# Patient Record
Sex: MALE | Race: BLACK OR AFRICAN AMERICAN
[De-identification: names, ages, dates, MRNs, and addresses within clinical notes are randomized per-mention and may not be internally consistent; named-entity substitution may affect disease eponyms.]

---

## 2020-01-05 ENCOUNTER — HOSPITAL ENCOUNTER (INPATIENT)
Dept: HOSPITAL 92 - ERS | Age: 45
LOS: 4 days | Discharge: ANOTHER HOSPITAL | DRG: 445 | End: 2020-01-09
Attending: INTERNAL MEDICINE | Admitting: INTERNAL MEDICINE
Payer: COMMERCIAL

## 2020-01-05 VITALS — BODY MASS INDEX: 21.7 KG/M2

## 2020-01-05 DIAGNOSIS — D63.8: ICD-10-CM

## 2020-01-05 DIAGNOSIS — D68.4: ICD-10-CM

## 2020-01-05 DIAGNOSIS — K83.1: Primary | ICD-10-CM

## 2020-01-05 DIAGNOSIS — Z79.899: ICD-10-CM

## 2020-01-05 DIAGNOSIS — D64.9: ICD-10-CM

## 2020-01-05 DIAGNOSIS — C78.7: ICD-10-CM

## 2020-01-05 DIAGNOSIS — E87.1: ICD-10-CM

## 2020-01-05 DIAGNOSIS — K22.8: ICD-10-CM

## 2020-01-05 DIAGNOSIS — C25.9: ICD-10-CM

## 2020-01-05 DIAGNOSIS — Z90.49: ICD-10-CM

## 2020-01-05 DIAGNOSIS — Z88.0: ICD-10-CM

## 2020-01-05 LAB
ALBUMIN SERPL BCG-MCNC: 3.2 G/DL (ref 3.5–5)
ALP SERPL-CCNC: 1737 U/L (ref 40–110)
ALT SERPL W P-5'-P-CCNC: 81 U/L (ref 8–55)
ANION GAP SERPL CALC-SCNC: 16 MMOL/L (ref 10–20)
APTT PPP: 47.4 SEC (ref 22.9–36.1)
AST SERPL-CCNC: 75 U/L (ref 5–34)
BASOPHILS # BLD AUTO: 0.1 THOU/UL (ref 0–0.2)
BASOPHILS NFR BLD AUTO: 0.8 % (ref 0–1)
BILIRUB SERPL-MCNC: 4.6 MG/DL (ref 0.2–1.2)
BUN SERPL-MCNC: 9 MG/DL (ref 8.9–20.6)
CALCIUM SERPL-MCNC: 9.6 MG/DL (ref 7.8–10.44)
CHLORIDE SERPL-SCNC: 101 MMOL/L (ref 98–107)
CO2 SERPL-SCNC: 23 MMOL/L (ref 22–29)
CREAT CL PREDICTED SERPL C-G-VRATE: 0 ML/MIN (ref 70–130)
EOSINOPHIL # BLD AUTO: 0.1 THOU/UL (ref 0–0.7)
EOSINOPHIL NFR BLD AUTO: 1.6 % (ref 0–10)
GLOBULIN SER CALC-MCNC: 4.6 G/DL (ref 2.4–3.5)
GLUCOSE SERPL-MCNC: 206 MG/DL (ref 70–105)
HGB BLD-MCNC: 11.9 G/DL (ref 14–18)
INR PPP: 1.7
LYMPHOCYTES # BLD: 2.4 THOU/UL (ref 1.2–3.4)
LYMPHOCYTES NFR BLD AUTO: 27.6 % (ref 21–51)
MCH RBC QN AUTO: 28.9 PG (ref 27–31)
MCV RBC AUTO: 89.5 FL (ref 78–98)
MONOCYTES # BLD AUTO: 0.6 THOU/UL (ref 0.11–0.59)
MONOCYTES NFR BLD AUTO: 6.5 % (ref 0–10)
NEUTROPHILS # BLD AUTO: 5.5 THOU/UL (ref 1.4–6.5)
NEUTROPHILS NFR BLD AUTO: 63.4 % (ref 42–75)
PLATELET # BLD AUTO: 300 THOU/UL (ref 130–400)
POTASSIUM SERPL-SCNC: 4.7 MMOL/L (ref 3.5–5.1)
PROTHROMBIN TIME: 19.9 SEC (ref 12–14.7)
RBC # BLD AUTO: 4.11 MILL/UL (ref 4.7–6.1)
SODIUM SERPL-SCNC: 135 MMOL/L (ref 136–145)
WBC # BLD AUTO: 8.7 THOU/UL (ref 4.8–10.8)

## 2020-01-05 PROCEDURE — 80053 COMPREHEN METABOLIC PANEL: CPT

## 2020-01-05 PROCEDURE — 36415 COLL VENOUS BLD VENIPUNCTURE: CPT

## 2020-01-05 PROCEDURE — 85730 THROMBOPLASTIN TIME PARTIAL: CPT

## 2020-01-05 PROCEDURE — 74330 X-RAY BILE/PANC ENDOSCOPY: CPT

## 2020-01-05 PROCEDURE — 99285 EMERGENCY DEPT VISIT HI MDM: CPT

## 2020-01-05 PROCEDURE — 85025 COMPLETE CBC W/AUTO DIFF WBC: CPT

## 2020-01-05 PROCEDURE — 85610 PROTHROMBIN TIME: CPT

## 2020-01-05 PROCEDURE — 36416 COLLJ CAPILLARY BLOOD SPEC: CPT

## 2020-01-05 RX ADMIN — METRONIDAZOLE SCH MLS: 500 INJECTION, SOLUTION INTRAVENOUS at 22:02

## 2020-01-05 NOTE — PDOC.EVN
Event Note





- Event Note


Event Note: 





Called by RN for blood sugar over 300.  Pt has NPH ordered for tonight.  I 

added humalog bedtime sliding scale. no further needs.

## 2020-01-05 NOTE — HP
HISTORY OF PRESENT ILLNESS:  Mr. Alfredo is a 44-year-old black man.  He was brought

to the ER earlier today under police custody with complaint of abdominal pain.  He

was evaluated and was felt to have a pancreatic duct obstruction.  GI was consulted. 



The patient was diagnosed with pancreatic cancer about 5 months ago and since then

he has been on insulin for diabetes.  No other significant past medical history. 



PAST SURGICAL HISTORY:  Remarkable for appendectomy and also he had some surgery

involving his left hand. 



ALLERGIES:  HE CLAIMS TO HAVE ALLERGIC TO PENICILLIN.



SOCIAL HISTORY:  He denies any history of cigarette smoking.  Denies EtOH abuse.

Denies substance abuse. 



FAMILY HISTORY:  His family history was reviewed and is not contributory.



MEDICATIONS:  Prior to admission, he was on; 

1. Methocarbamol.

2. NPH insulin 17 units in the morning and 13 units in the evening.

3. Regular insulin 15 units b.i.d.

4. Gabapentin.

5. Morphine.



REVIEW OF SYSTEMS:  CONSTITUTIONAL:  He claims that he has some fever yesterday.  He

denies any weakness. 

HEENT:  No headache.  No ocular pain.  No sore throat.  No rhinorrhea.  No earache.

No epistaxis. 

NECK:  No neck pain.  No neck stiffness. 

CARDIOVASCULAR:  No shortness of breath.  No chest pain. 

PULMONARY:  No coughing. 

GASTROINTESTINAL:  Admits to abdominal pain.  There is no diarrhea.  No vomiting. 

GENITOURINARY:  No dysuria.  No hematuria. 

ENDOCRINOLOGY:  No heat or cold intolerance.  No polyuria, polydipsia, or

polyphagia. 

HEMATOLOGY:  No abnormal bleeding.  No ecchymosis. 

LYMPHATIC:  No palpable lymphadenopathy.  No painful lymphadenopathy. 

SKIN:  No rash.  No itching. 

ALLERGY:  No hay fever. 

MUSCULOSKELETAL:  No arthritis. 

NEUROLOGICAL:  No seizure. 

PSYCHIATRIC:  No anxiety.  No depression.



PHYSICAL EXAMINATION:

GENERAL:  At the current time, he is alert, oriented, in no distress. 

VITAL SIGNS:  His latest vital signs show a temperature of 99.1, pulse rate 87,

respiratory rate 16, and blood pressure 126/93. 

HEENT:  His head is normocephalic and atraumatic.  Both his pupils are equal and

reactive.  Ears and nose are normal.  Oral mucosa is moist.  Pharyngeal area is

clear. 

NECK:  Supple.  There is no distention of the jugular vein.  No lymphadenopathy

felt.  Thyroid gland not palpable.  There is no carotid bruit. 

CHEST:  Symmetrical with regular S1 and S2. 

LUNGS:  Clear. 

ABDOMEN:  Soft.  Bowel sounds heard.  We could not appreciate any organomegaly. 

EXTREMITIES:  Limbs show no edema. 

NEUROLOGICAL:  He moves all extremities.



LABORATORY DATA:  PT was noticed to be 19.9, PTT 47.4.  Glucose at that same time

was noticed to be 249. 



ASSESSMENT AND PLAN:  This is a 44-year-old black man with pancreatic cancer,

diabetes mellitus, who was admitted with abdominal pain, possible pancreatic duct

obstruction.  GI was consulted.  The patient will be n.p.o. for now.  He will be

started on normal saline.  He will also be on insulin via sliding scale.  He will be

admitted to the medical floor.  Further evaluation and management will depend on the

course of his hospitalization and the results will depend on the course of his

hospitalization.  His PT and PTT were noticed to be prolonged.  We will hold any

Lovenox for deep venous thrombosis prophylaxis at this time. 







Job ID:  [id number]

## 2020-01-05 NOTE — CON
DATE OF CONSULTATION:  01/05/2020



REASON FOR CONSULTATION:  Pancreatic cancer with probable biliary obstruction.



CONSULTING PROVIDER:  Abdoulaye Thornton MD



HISTORY OF PRESENT ILLNESS:  The patient is a 44-year-old  male with

a past medical history of diabetes, anemia of chronic disease, and diagnosis of

metastatic pancreatic cancer, presenting with complaints of abdominal pain and

jaundice.  Per chart review, the patient was diagnosed with metastatic pancreas

cancer in August 2019, when he was admitted to the hospital for evaluation of

abdominal pain.  Imaging at that time showed numerous metastatic lesions throughout

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

carcinoma.  He was subsequently seen at [facility] as an outpatient and placed on

octreotide treatments as part of treatment for this neuroendocrine pancreatic tumor

and while on treatment had decrease in his abdominal pain (not resolution), but also

decrease in his jaundice.  The last time he received treatments with octreotide was

approximately October 2019.  Since then, he has had chronic abdominal pain located

in the midepigastric and right upper quadrant that has been intermittently

increasing requiring the use of narcotic medications in order to control his pain.

However, over the last 2 to 3 weeks, he has been having progressively worsening

right upper quadrant/midepigastric abdominal pain that he characterizes as a

pressure/ache, will radiate to the left upper quadrant, periumbilical region and

into the mid back, is now constant with waxing/waning severity, and reaching a

severity of 7 to 8/10.  The pain is worse with eating both solid and liquid food

stuffs and would usually occur within 5 to 10 minutes after ingestion as well as

certain body positions in bed.  The pain is better with sitting forward, maintaining

more of an upright/erect posture, and with the pain medications administered so far.

 With his increase in abdominal pain, this was also associated with increased

jaundice including scleral icterus and dark urine.  He has also been experiencing

increased intermittent episodes of nausea and vomiting with blood-tinged emesis

yesterday prior to admission.  He also endorses increased abdominal bloating,

subjective fevers, chills, and a weight loss of 36 pounds over the last 4 to 5

months unintentionally.  With the increase in his abdominal pain, he was

subsequently evaluated in the [place] ER and while in the ER, he was noted to have

a mild elevation in both AST and ALT, but a significantly increase in his total

bilirubin as well as imaging consistent with a biliary duct obstruction.  He was

subsequently transferred to [facility] for further evaluation and

treatment.  Currently, he denies any hematochezia, melena, dysphagia, or

odynophagia, but does continue to have the right upper quadrant abdominal pain as

stated above. 



REVIEW OF SYSTEMS:  A 10-category review of systems was obtained with all responses

negative except for the pertinent positives as listed in the HPI. 



PAST MEDICAL HISTORY:  As per HPI.



PAST SURGICAL HISTORY:  Appendectomy and left hand surgery.



FAMILY HISTORY:  Denies any GI malignancies.



SOCIAL HISTORY:  Denies any tobacco, alcohol, or illicit drug use.



OUTPATIENT MEDICATIONS:  Reviewed.



ALLERGIES:  PENICILLIN.



PHYSICAL EXAMINATION:

VITAL SIGNS:  Temperature 97.8, pulse 78, blood pressure 132/87, respiratory rate

16, saturating 100% on room air. 

GENERAL:  The patient was lying in bed, in no acute distress.  Alert and oriented

x4. 

HEENT:  Normocephalic and atraumatic. 

NECK:  Supple.  No JVD noted, but scleral icterus positive bilaterally. 

CARDIOVASCULAR:  Regular rate and rhythm with no discernible murmurs, gallops, or

rubs. 

RESPIRATORY:  Clear to auscultation bilaterally with no discernible wheezes or

rales. 

ABDOMEN:  Normoactive bowel sounds.  Soft, nondistended.  Tenderness to palpation in

the midepigastric right upper quadrant and right lower quadrants. 

EXTREMITIES:  No cyanosis, clubbing, or edema.



LABORATORY DATA:  CBC with a white blood cell count of 8.7, hemoglobin 11.9,

hematocrit 36.8, platelets 300.  INR 1.7.  Chemistry with a sodium of 135, potassium

4.7, chloride 101, CO2 of 23, BUN 9, creatinine 0.84, and glucose 206.  AST 75, ALT

81, alkaline phosphatase 1737, total bilirubin 4.6, and albumin 3.2. 



IMAGING DATA:  CT of the abdomen and pelvis was obtained in the outside institution

on January 4, 2020, which showed innumerable nodular lesions located throughout the

liver consistent with widespread metastatic disease and resultant hepatomegaly.

There was also a large mass within the pancreatic head measuring 4.7 x 4.6 cm, which

obstructs the pancreatic duct as well as the common bile duct with the common bile

duct measuring 1.3 cm in diameter.  With this obstruction, there was mild

intrahepatic and extrahepatic dilatation.  Also noted was a large volume of stool

within the colon consistent with constipation. 



ASSESSMENT AND PLAN:  The patient is a 44-year-old male with past medical history of

diabetes, anemia of chronic disease, and pancreatic cancer with metastatic disease

to the liver, presenting with continued right upper quadrant/midepigastric abdominal

pain, but imaging and labs are now concerning for a possible obstructive process. 



Biliary obstruction/jaundice:  The patient is presenting with a history of

metastatic pancreatic cancer with the diagnosis made in August 2019, and what sounds

like a possible biliary obstruction at that time.  However, he was placed on

octreotide as an outpatient and did respond to treatment with decreased jaundice as

well as nausea, vomiting, and a mild decrease in his abdominal pain.  However, these

treatments were stopped in October 2019 for unknown reasons, now currently

presenting with worsening of his abdominal pain with radiation of his pain to his

mid back concerning for possible pancreatitis, but also with imaging showing

compression of the common bile duct with extrahepatic dilatation at approximately

1.3 cm, which is new when compared to prior imaging.  He does have mild elevation in

his LFTs, with his LFT pattern more consistent with a cholestatic-type picture as

opposed to an obstructive-type picture, but given his imaging finding so far and

with the progressive nature of his cancer, it could potentially generate this

picture.  With the compression of his common bile duct, the patient is at increased

risk for cholangitis, although he does not display any signs or symptoms consistent

with that diagnosis at this time. 



RECOMMENDATIONS:  

1. We will continue to trend his LFTs daily for signs of increased inflammation or

liver failure. 

2. We would also continue to trend his INR daily given the metastatic disease to his

liver and monitoring for possible liver failure. 

3. We would place the patient on a clear liquid diet today with n.p.o. at midnight

for a possible ERCP tomorrow to decompress his common bile duct.  The plan would be

to place a plastic stent within the common bile duct to temporize his condition

until he is seen in the outpatient clinic for further treatment. 

4. We would place the patient on antibiotic prophylaxis given the increased risk of

cholangitis; given his penicillin allergy, I will start him on ciprofloxacin and

metronidazole. 

5. We would recommend possible transferred to [facility] for higher level of care given

the fact that he is being followed there for treatment of his pancreatic cancer and

their ERCP capabilities. 

6. We will continue to follow.  Please call with any questions.







Job ID:  [id number]

## 2020-01-06 LAB
ALBUMIN SERPL BCG-MCNC: 2.9 G/DL (ref 3.5–5)
ALP SERPL-CCNC: 1476 U/L (ref 40–110)
ALT SERPL W P-5'-P-CCNC: 64 U/L (ref 8–55)
ANION GAP SERPL CALC-SCNC: 15 MMOL/L (ref 10–20)
AST SERPL-CCNC: 53 U/L (ref 5–34)
BASOPHILS # BLD AUTO: 0.1 THOU/UL (ref 0–0.2)
BASOPHILS NFR BLD AUTO: 0.9 % (ref 0–1)
BILIRUB SERPL-MCNC: 4 MG/DL (ref 0.2–1.2)
BUN SERPL-MCNC: 8 MG/DL (ref 8.9–20.6)
CALCIUM SERPL-MCNC: 9.2 MG/DL (ref 7.8–10.44)
CHLORIDE SERPL-SCNC: 103 MMOL/L (ref 98–107)
CO2 SERPL-SCNC: 23 MMOL/L (ref 22–29)
CREAT CL PREDICTED SERPL C-G-VRATE: 128 ML/MIN (ref 70–130)
EOSINOPHIL # BLD AUTO: 0.2 THOU/UL (ref 0–0.7)
EOSINOPHIL NFR BLD AUTO: 2.2 % (ref 0–10)
GLOBULIN SER CALC-MCNC: 4.2 G/DL (ref 2.4–3.5)
GLUCOSE SERPL-MCNC: 101 MG/DL (ref 70–105)
HGB BLD-MCNC: 10.5 G/DL (ref 14–18)
LYMPHOCYTES # BLD: 2.5 THOU/UL (ref 1.2–3.4)
LYMPHOCYTES NFR BLD AUTO: 32.6 % (ref 21–51)
MCH RBC QN AUTO: 27.4 PG (ref 27–31)
MCV RBC AUTO: 88.7 FL (ref 78–98)
MONOCYTES # BLD AUTO: 0.6 THOU/UL (ref 0.11–0.59)
MONOCYTES NFR BLD AUTO: 7.7 % (ref 0–10)
NEUTROPHILS # BLD AUTO: 4.4 THOU/UL (ref 1.4–6.5)
NEUTROPHILS NFR BLD AUTO: 56.5 % (ref 42–75)
PLATELET # BLD AUTO: 295 THOU/UL (ref 130–400)
POTASSIUM SERPL-SCNC: 4.2 MMOL/L (ref 3.5–5.1)
RBC # BLD AUTO: 3.83 MILL/UL (ref 4.7–6.1)
SODIUM SERPL-SCNC: 137 MMOL/L (ref 136–145)
WBC # BLD AUTO: 7.8 THOU/UL (ref 4.8–10.8)

## 2020-01-06 PROCEDURE — BF111ZZ FLUOROSCOPY OF BILIARY AND PANCREATIC DUCTS USING LOW OSMOLAR CONTRAST: ICD-10-PCS | Performed by: INTERNAL MEDICINE

## 2020-01-06 PROCEDURE — 0FJD8ZZ INSPECTION OF PANCREATIC DUCT, VIA NATURAL OR ARTIFICIAL OPENING ENDOSCOPIC: ICD-10-PCS | Performed by: INTERNAL MEDICINE

## 2020-01-06 PROCEDURE — 0DJ08ZZ INSPECTION OF UPPER INTESTINAL TRACT, VIA NATURAL OR ARTIFICIAL OPENING ENDOSCOPIC: ICD-10-PCS | Performed by: INTERNAL MEDICINE

## 2020-01-06 RX ADMIN — METRONIDAZOLE SCH: 500 INJECTION, SOLUTION INTRAVENOUS at 15:15

## 2020-01-06 RX ADMIN — METRONIDAZOLE SCH MLS: 500 INJECTION, SOLUTION INTRAVENOUS at 22:05

## 2020-01-06 RX ADMIN — METRONIDAZOLE SCH MLS: 500 INJECTION, SOLUTION INTRAVENOUS at 05:42

## 2020-01-06 NOTE — RAD
Exam: Intraprocedure fluoroscopy for ERCP

Exposure: 4.1 minutes. 8.269 gray per centimeter square



HISTORY: Intraoperative fluoroscopy



FINDINGS: Single fluoroscopic view demonstrates endoscopy with a small wire, likely cannulating the b
iliary system.



IMPRESSION: Single fluoroscopic view as above.



Reported By: Dorene Spencer 

Electronically Signed:  1/6/2020 2:28 PM

## 2020-01-06 NOTE — OP
DATE OF PROCEDURE:  01/06/2020



PROCEDURE PERFORMED:  Endoscopic retrograde cholangiopancreatography (incomplete).



INDICATION FOR PROCEDURE:  Metastatic pancreatic cancer with compression of the

common bile duct and possible obstruction. 



DESCRIPTION OF PROCEDURE:  After the risks and benefits of the procedure were

explained to the patient including risks of bleeding, infection, perforation,

reactions to anesthesia, aspiration, post-ERCP pancreatitis, and/or pain, informed

consent was obtained.  The patient was then taken to the endoscopy suite, where

general anesthesia and endotracheal intubation were performed.  Once the patient was

intubated and sedated, he was maneuvered into the prone position in anticipation of

the ERCP.  Once in adequate position, the standard duodenoscope was introduced into

the mouth with intubation of the esophagus, stomach, and the proximal small

intestines with the findings listed below.  The patient tolerated the procedure well

with no immediate perioperative complications.  Upon conclusion of the procedure,

all equipment was removed from the patient and he was transferred to PACU in

satisfactory condition. 



EGD FINDINGS:  Normal-appearing mucosa was seen in the esophagus, stomach, and the

proximal small intestines, albeit limited views were obtained from these regions

given the side-viewing nature of the duodenoscope.  There was no evidence of

erosions, ulcerations, mass lesions, or active/recent bleeding. 



ERCP FINDINGS:  The ampulla was easily identified within the second portion of

duodenum and did have a slight bulbous type appearance to the proximal aspect of it.

 Upon initial evaluation of the ampulla, black bile was seen actively draining from

the orifice itself.  Using a 5-mm sphincterotome, the ampulla was cannulated, but

attempts to cannulate the common bile duct were unsuccessful with repeated

redirection of the guidewire into the pancreatic duct.  During the course of

attempting to cannulate the common bile duct, black bile was seen repeatedly

emanating from the ampulla itself with no evidence of hemobilia or stone debris.

After multiple attempts in attempting to cannulate the common bile duct, the

procedure was then prematurely aborted with all equipment removed from the patient. 



IMPRESSION:  

1. Normal esophagogastroduodenoscopy findings.

2. Ampulla of Vater easily identified in the second portion of the duodenum with

black bile emanating from the ampullary orifice. 

3. Unsuccessful attempt at cannulation of the common bile duct.



RECOMMENDATIONS:  

1. Would continue broad-spectrum antibiotics for prophylaxis of ascending

cholangitis. 

2. Pain control per primary team.

3. Would recommend transfer the patient to an outside facility for repeat ERCP

and/or treatment of his pancreatic cancer (preferably would recommend transfer to

[facility] as the patient has been receiving his care there thus far). 

4. Would place the patient on a full liquid diet for the time being.

5. Would continue to trend LFTs daily. 

We will continue to follow.  Please call with any questions.







Job ID:  [id number]

## 2020-01-06 NOTE — PDOC.HOSPP
- Subjective


Encounter Date: 01/06/20


Encounter Time: 15:00


Subjective: 





Patient seen and examined for jaundice. No new Abd pain. No new complaints. No 

overnight events





- Objective


Vital Signs & Weight: 


 Vital Signs (12 hours)











  Temp Pulse Resp BP Pulse Ox


 


 01/06/20 15:15   68  17  132/76  99


 


 01/06/20 15:00   70  18  124/90  100


 


 01/06/20 07:45      98


 


 01/06/20 07:16  98.1 F  68  16  125/81  98








 Weight











Weight                         160 lb














I&O: 


 











 01/05/20 01/06/20 01/07/20





 06:59 06:59 06:59


 


Intake Total  960 


 


Balance  960 











Result Diagrams: 


 01/06/20 08:30





 01/06/20 08:30


Additional Labs: 


 Accuchecks











  01/06/20 01/06/20 01/06/20





  15:04 08:11 05:45


 


POC Glucose  111 H  110  118 H














  01/06/20 01/06/20 01/05/20





  04:02 00:07 20:09


 


POC Glucose  111 H  231 H  326 H








 Laboratory Tests











  01/06/20





  08:30


 


Total Bilirubin  4.0 H











Radiology Reviewed by me: Yes (ERCP XR reviewed)





Hospitalist ROS





- Review of Systems


Respiratory: denies: cough, dry, shortness of breath, hemoptysis, SOB with 

excertion, pleuritic pain, sputum, wheezing, other


Cardiovascular: denies: chest pain, palpitations, orthopnea, paroxysmal noc. 

dyspnea, edema, light headedness, other





- Medication


Medications: 


Active Medications











Generic Name Dose Route Start Last Admin





  Trade Name Freq  PRN Reason Stop Dose Admin


 


Ciprofloxacin/Dextrose 400 mg/  200 mls @ 200 mls/hr  01/05/20 21:00  01/06/20 

08:13





  Device  IVPB   200 mls





  Q12HR YAO   Administration





     





     





     





     


 


Metronidazole 500 mg/ Device  100 mls @ 100 mls/hr  01/05/20 22:00  01/06/20 15:

15





  IVPB   Not Given





  Q8HR YAO   





     





     





     





     


 


Morphine Sulfate  2 mg  01/05/20 15:33  01/06/20 07:03





  Morphine  SLOW IVP   2 mg





  Q4H PRN   Administration





  Pain   





     





     





     


 


Ondansetron HCl  4 mg  01/05/20 15:33  01/06/20 16:45





  Zofran  IVP   4 mg





  Q6H PRN   Administration





  Nausea/Vomiting   





     





     





     


 


Polyethylene Glycol  17 gm  01/06/20 09:00  01/06/20 08:09





  Miralax  PO   Not Given





  DAILY YAO   





     





     





     





     














- Exam


General Appearance: NAD


Heart: RRR, no gallops, no rubs, normal peripheral pulses


Respiratory: no wheezes, no rales, no ronchi, normal chest expansion


Gastrointestinal: soft, non-distended, normal bowel sounds, no guarding, no 

rigidity, tender to palpation (gen)


Extremities: no cyanosis, no clubbing, no edema


Neurological: no new deficit





Hosp A/P





- Plan


DVT proph w/SCDs





Obstructive jaundice


h/o Pancreatic CA


DM2


Chronic Anemia


Coagulopathy





PLAN:


ERCP attempted


Await [facility] bed


Cont Empiric Atbx


Cont sliding scale


Resume NPH based on blood sugars today


Case d/w Dr Goode per patient request - Patient is requesting Octreotide 

treatment "which worked in September". Will request records from [facility]

## 2020-01-07 RX ADMIN — Medication PRN LOZ: at 11:58

## 2020-01-07 RX ADMIN — Medication PRN LOZ: at 01:53

## 2020-01-07 RX ADMIN — INSULIN HUMAN PRN UNITS: 100 INJECTION, SOLUTION PARENTERAL at 11:15

## 2020-01-07 RX ADMIN — Medication PRN LOZ: at 05:20

## 2020-01-07 RX ADMIN — METRONIDAZOLE SCH MLS: 500 INJECTION, SOLUTION INTRAVENOUS at 05:16

## 2020-01-07 RX ADMIN — INSULIN HUMAN PRN UNITS: 100 INJECTION, SOLUTION PARENTERAL at 16:00

## 2020-01-07 NOTE — PDOC.HOSPP
- Subjective


Encounter Date: 01/07/20


Encounter Time: 14:00


Subjective: 





Patient seen and examined for Obstrutive jaundice. No new complaints. No 

overnight events





- Objective


Vital Signs & Weight: 


 Vital Signs (12 hours)











  Temp Pulse Resp BP Pulse Ox


 


 01/07/20 20:01  98.4 F  84  20  125/83  100








 Weight











Weight                         160 lb














I&O: 


 











 01/06/20 01/07/20 01/08/20





 06:59 06:59 06:59


 


Intake Total 


 


Balance 











Result Diagrams: 


 01/06/20 08:30





 01/06/20 08:30


Additional Labs: 


 Accuchecks











  01/07/20 01/07/20 01/07/20





  20:21 15:40 11:21


 


POC Glucose  178 H  382 H  438 H














  01/07/20 01/07/20





  09:49 05:19


 


POC Glucose  488 H  377 H














Hospitalist ROS





- Review of Systems


Cardiovascular: denies: chest pain, palpitations, orthopnea, paroxysmal noc. 

dyspnea, edema, light headedness, other


Gastrointestinal: denies: nausea, vomiting, abdominal pain, diarrhea, 

constipation, melena, hematochezia, other





- Medication


Medications: 


Active Medications











Generic Name Dose Route Start Last Admin





  Trade Name Freq  PRN Reason Stop Dose Admin


 


Ciprofloxacin  500 mg  01/07/20 20:00  01/07/20 20:22





  Cipro  PO   500 mg





  0600,2000 YAO   Administration





     





     





     





     


 


Insulin Human NPH  15 unit  01/07/20 21:00  01/07/20 20:20





  Humulin N  SC   Not Given





  QPM YAO   





     





     





     





     


 


Insulin Human Regular  0 units  01/06/20 15:43  01/06/20 21:21





  Humulin R  SC   4 unit





  .BEDTIME SLIDING SC PRN   Administration





  Bedtime Correctional Scale   





     





     





     


 


Insulin Human Regular  0 units  01/07/20 10:04  01/07/20 16:00





  Humulin R  SC   13 units





  .AGGRESSIVE SLIDING  PRN   Administration





  Aggressive Correctional Scale   





     





     





     


 


Metronidazole  500 mg  01/07/20 15:00  01/07/20 20:22





  Flagyl  PO   500 mg





  TID YAO   Administration





     





     





     





     


 


Morphine Sulfate  15 mg  01/06/20 20:41  01/06/20 21:23





  Morphine Ir Tab  PO   15 mg





  Q4HR PRN   Administration





  Pain   





     





     





     


 


Ondansetron HCl  4 mg  01/05/20 15:33  01/06/20 16:45





  Zofran  IVP   4 mg





  Q6H PRN   Administration





  Nausea/Vomiting   





     





     





     


 


Polyethylene Glycol  17 gm  01/06/20 09:00  01/07/20 09:37





  Miralax  PO   17 gm





  DAILY YAO   Administration





     





     





     





     


 


Saccharomyces Boulardii  250 mg  01/07/20 09:00  01/07/20 09:37





  Florastor  PO   250 mg





  DAILY YAO   Administration





     





     





     





     


 


Throat Lozenges  1 iris  01/07/20 01:29  01/07/20 11:58





  Cepastat Lozenges  PO   1 iris





  Q2H PRN   Administration





  Sore Throat   





     





     





     














- Exam


General Appearance: NAD


Neck: supple, no JVD


Heart: RRR, no gallops


Respiratory: CTAB, no rales


Gastrointestinal: soft, normal bowel sounds


Extremities: no edema





Hosp A/P





- Plan


DVT proph w/SCDs





Obstructive jaundice


h/o Pancreatic CA


DM2


Chronic Anemia





PLAN:


Await [facility] bed


Change Atbx to PO


Cont sliding scale


Resume NPH

## 2020-01-08 VITALS — SYSTOLIC BLOOD PRESSURE: 130 MMHG | DIASTOLIC BLOOD PRESSURE: 83 MMHG

## 2020-01-08 LAB
ALBUMIN SERPL BCG-MCNC: 2.9 G/DL (ref 3.5–5)
ALP SERPL-CCNC: 1744 U/L (ref 40–110)
ALT SERPL W P-5'-P-CCNC: 68 U/L (ref 8–55)
ANION GAP SERPL CALC-SCNC: 12 MMOL/L (ref 10–20)
AST SERPL-CCNC: 85 U/L (ref 5–34)
BASOPHILS # BLD AUTO: 0 THOU/UL (ref 0–0.2)
BASOPHILS NFR BLD AUTO: 0.4 % (ref 0–1)
BILIRUB SERPL-MCNC: 5.2 MG/DL (ref 0.2–1.2)
BUN SERPL-MCNC: 9 MG/DL (ref 8.9–20.6)
CALCIUM SERPL-MCNC: 9.2 MG/DL (ref 7.8–10.44)
CHLORIDE SERPL-SCNC: 98 MMOL/L (ref 98–107)
CO2 SERPL-SCNC: 26 MMOL/L (ref 22–29)
CREAT CL PREDICTED SERPL C-G-VRATE: 100 ML/MIN (ref 70–130)
EOSINOPHIL # BLD AUTO: 0.2 THOU/UL (ref 0–0.7)
EOSINOPHIL NFR BLD AUTO: 2.5 % (ref 0–10)
GLOBULIN SER CALC-MCNC: 4.2 G/DL (ref 2.4–3.5)
GLUCOSE SERPL-MCNC: 341 MG/DL (ref 70–105)
HGB BLD-MCNC: 11.1 G/DL (ref 14–18)
INR PPP: 1.2
LYMPHOCYTES # BLD: 2 THOU/UL (ref 1.2–3.4)
LYMPHOCYTES NFR BLD AUTO: 23.2 % (ref 21–51)
MCH RBC QN AUTO: 28 PG (ref 27–31)
MCV RBC AUTO: 89.3 FL (ref 78–98)
MONOCYTES # BLD AUTO: 0.5 THOU/UL (ref 0.11–0.59)
MONOCYTES NFR BLD AUTO: 6.3 % (ref 0–10)
NEUTROPHILS # BLD AUTO: 5.8 THOU/UL (ref 1.4–6.5)
NEUTROPHILS NFR BLD AUTO: 67.6 % (ref 42–75)
PLATELET # BLD AUTO: 319 THOU/UL (ref 130–400)
POTASSIUM SERPL-SCNC: 4.4 MMOL/L (ref 3.5–5.1)
PROTHROMBIN TIME: 14.7 SEC (ref 12–14.7)
RBC # BLD AUTO: 3.97 MILL/UL (ref 4.7–6.1)
SODIUM SERPL-SCNC: 132 MMOL/L (ref 136–145)
WBC # BLD AUTO: 8.6 THOU/UL (ref 4.8–10.8)

## 2020-01-08 RX ADMIN — INSULIN HUMAN PRN UNITS: 100 INJECTION, SOLUTION PARENTERAL at 16:16

## 2020-01-08 RX ADMIN — INSULIN HUMAN PRN UNITS: 100 INJECTION, SOLUTION PARENTERAL at 11:05

## 2020-01-08 NOTE — PDOC.HOSPP
- Subjective


Encounter Date: 01/08/20


Encounter Time: 10:00


Subjective: 





Patient seen and examined for Obstructive jaundice. Refusing insulin/meds. No 

nausea/vomiting. No overnight events





- Objective


Vital Signs & Weight: 


 Vital Signs (12 hours)











  Temp Resp


 


 01/08/20 07:46  98.3 F  16








 Weight











Weight                         160 lb














I&O: 


 











 01/07/20 01/08/20 01/09/20





 06:59 06:59 06:59


 


Intake Total 912 1880 


 


Balance 912 1880 











Result Diagrams: 


 01/06/20 08:30





 01/06/20 08:30


Additional Labs: 


 Accuchecks











  01/08/20 01/08/20 01/07/20





  07:44 02:02 20:21


 


POC Glucose  339 H  309 H  178 H














  01/07/20 01/07/20





  15:40 11:21


 


POC Glucose  382 H  438 H














Hospitalist ROS





- Review of Systems


Respiratory: denies: cough, dry, shortness of breath, hemoptysis, SOB with 

excertion, pleuritic pain, sputum, wheezing, other


Cardiovascular: denies: chest pain, palpitations, orthopnea, paroxysmal noc. 

dyspnea, edema, light headedness, other





- Medication


Medications: 


Active Medications











Generic Name Dose Route Start Last Admin





  Trade Name Freq  PRN Reason Stop Dose Admin


 


Ciprofloxacin  500 mg  01/07/20 20:00  01/08/20 05:48





  Cipro  PO   Not Given





  0600,2000 YAO   





     





     





     





     


 


Insulin Human Regular  0 units  01/06/20 15:43  01/06/20 21:21





  Humulin R  SC   4 unit





  .BEDTIME SLIDING SC PRN   Administration





  Bedtime Correctional Scale   





     





     





     


 


Insulin Human Regular  0 units  01/07/20 10:04  01/07/20 16:00





  Humulin R  SC   13 units





  .AGGRESSIVE SLIDING  PRN   Administration





  Aggressive Correctional Scale   





     





     





     


 


Metronidazole  500 mg  01/07/20 15:00  01/07/20 20:22





  Flagyl  PO   500 mg





  TID YAO   Administration





     





     





     





     


 


Morphine Sulfate  15 mg  01/06/20 20:41  01/06/20 21:23





  Morphine Ir Tab  PO   15 mg





  Q4HR PRN   Administration





  Pain   





     





     





     


 


Ondansetron HCl  4 mg  01/05/20 15:33  01/06/20 16:45





  Zofran  IVP   4 mg





  Q6H PRN   Administration





  Nausea/Vomiting   





     





     





     


 


Polyethylene Glycol  17 gm  01/06/20 09:00  01/07/20 09:37





  Miralax  PO   17 gm





  DAILY YAO   Administration





     





     





     





     


 


Saccharomyces Boulardii  250 mg  01/07/20 09:00  01/07/20 09:37





  Florastor  PO   250 mg





  DAILY YAO   Administration





     





     





     





     


 


Throat Lozenges  1 iris  01/07/20 01:29  01/07/20 11:58





  Cepastat Lozenges  PO   1 iris





  Q2H PRN   Administration





  Sore Throat   





     





     





     














- Exam


General Appearance: NAD


Neck: supple, no JVD


Heart: RRR, no gallops


Respiratory: CTAB, no rales


Gastrointestinal: soft, non-distended


Extremities: no edema





Hosp A/P





- Plan


DVT proph w/SCDs





Obstructive jaundice


h/o Pancreatic CA


DM2- uncontrolled


Chronic Anemia





PLAN:


Patient refused NPH/Atbx yesterday evening. Also refused labs this AM.


I explained different sliding scale - he agrees with aggressive sliding scale 

with NPH 17 units QAM and 13 units QPM


Await [facility] bed


Cont other meds

## 2020-01-08 NOTE — PRG
DATE OF SERVICE:  01/07/2020



REASON FOR CONSULTATION:  Pancreatic cancer, biliary compression/obstruction.



SUBJECTIVE:  The patient did well overnight with no evidence of post ERCP

pancreatitis in the postoperative period, nor any evidence of that today.  He is

currently tolerating a solid diet without any difficulty.  Upon further questioning

today, he added that his back pain has since resolved, but he does continue to have

the midepigastric/right upper quadrant abdominal pain.  Otherwise, he denies any

nausea, vomiting, fevers, chills, hematemesis, melena, hematochezia, or

hematochezia. 



OBJECTIVE:  VITAL SIGNS:  Temperature 98.4, pulse 84, blood pressure 125/83,

respiratory rate 18, saturating 100% on room air. 

GENERAL:  The patient was lying in bed, in no acute distress.  Alert and oriented

x4. 

CARDIOVASCULAR:  Regular rate and rhythm. 

RESPIRATORY:  Clear to auscultation bilaterally. 

ABDOMEN:  Normoactive bowel sounds.  Soft, nondistended.  Mild tenderness to

palpation in the midepigastric and right upper quadrant. 

EXTREMITIES:  No cyanosis, clubbing, or edema.



LABORATORY DATA:  No current studies are available for review.



IMAGING DATA:  The patient underwent ERCP on January 6, 2020, with inability to

cannulate the common bile duct despite multiple attempts.  As such, no biliary stent

was placed for adequate drainage of the biliary tree. 



ASSESSMENT AND PLAN:  The patient is a 44-year-old male, with past medical history

of diabetes, anemia of chronic disease, and pancreatic cancer with metastatic

disease to the liver, presenting with increased right upper quadrant/midepigastric

abdominal pain with imaging and labs concerning for possible obstructive process. 



Biliary obstruction/jaundice. 



The patient is presenting with a history of metastatic pancreatic cancer with the

diagnosis made in August 2019 secondary to increased right upper quadrant abdominal

pain.  Per review of the imaging obtained at that time, he did not show any evidence

of biliary dilation, but did show dilation of the pancreatic duct concerning for at

least partial obstruction at that time.  Since then, the patient has had

intermittent episodes of worsening right upper quadrant abdominal pain and had been

placed on octreotide as part of treatment for the neuroendocrine tumor, but this was

subsequently stopped in October 2019 for unknown reasons (it seems that the patient

missed a number of appointments).  While on treatment with octreotide he did have

improvement in his abdominal pain as well as jaundice, making this a possible

treatment regimen to come back to but the patient was initially started on treatment

and was being followed at [facility] with the current plan of care based on the last

clinic visit unknown.  However, the patient is now coming in with increased right

upper quadrant abdominal pain with mildly elevated transaminases, but an elevated

alkaline phosphatase and total bilirubin associated with dilation of the common bile

duct to 1.3 cm concerning for extrinsic compression.  Attempt at ERCP on January 6, 2020, was unsuccessful cannulation of the common bile duct with no stent placed at

that time.  Otherwise, normal findings were seen on the EGD portion of the exam.  At

this time, the most likely reason for his elevated bilirubin and possibly

contributing to his right upper quadrant abdominal pain would be the existence of

the metastatic pancreatic cancer.  With imaging and laboratory findings, there does

appear to be some element of compression of the bile duct, but black bile was seen

draining from the ampulla during the ERCP, indicating more of an intermittent or

partial obstruction rather than complete obstruction at this time.  However, the

patient does wish to pursue further treatment for this metastatic pancreatic cancer

he will likely need at least a temporary stent placement to allow for biliary

drainage while being treated for this condition. 



RECOMMENDATIONS:  

1. We would continue to trend the patient's LFTs daily for possible worsening

obstruction. 

2. We would continue to trend his INR daily given metastatic disease to his liver

and monitoring for possible liver failure. 

3. We would attempt to transfer the patient back to [facility] for higher level of care

for repeat ERCP and stent placement as well as following up with Oncology to further

guide any additional management for this metastatic pancreatic cancer. 

4. Pain control per primary team.

5. We would continue antibiotics as part of prophylaxis for possible infection 

secondary to biliary obstruction. 

We will continue to follow.  Please call with any questions.







Job ID:  [id number]
DATE OF SERVICE:  01/08/2020



REASON FOR CONSULTATION:  Pancreatic cancer, biliary compression/obstruction.



SUBJECTIVE:  The patient did well overnight with no acute events or problems.  Over

the course of the day today, the patient has been somewhat belligerent with the

nursing staff concerning his blood sugar levels and subsequently refused

administration of insulin and antibiotics at one point.  Upon further questioning

the patient today, he states that his back pain is still intermittent, but currently

at a manageable level.  He also states that his right upper quadrant abdominal pain

is unchanged since yesterday.  Otherwise, he denies any nausea, vomiting, fevers,

chills, hematemesis, melena, or hematochezia. 



OBJECTIVE:  VITAL SIGNS:  Temperature 99.1, pulse 90, blood pressure 130/83,

respiratory rate 18, saturating 100% on room air. 

GENERAL:  The patient was lying in bed, in no acute distress.  Alert and oriented

x4. 

CARDIOVASCULAR:  Regular rate and rhythm. 

RESPIRATORY:  Clear to auscultation bilaterally. 

ABDOMEN:  Normoactive bowel sounds, soft, nondistended.  Mild tenderness to

palpation in the midepigastric and right upper quadrants. 

EXTREMITIES:  No cyanosis, clubbing, or edema.



LABORATORY DATA:  CBC with a white blood cell count of 8.6, hemoglobin 11.1,

hematocrit 35.5, platelets 319.  INR 1.2.  Chemistry with a sodium of 132, potassium

4.4, chloride 98, CO2 of 26, BUN 9, creatinine 0.95, glucose 341.  AST 85, ALT 68,

alkaline phosphatase 1744, total bilirubin 5.2, albumin 2.9. 



IMAGING DATA:  No current GI imaging is available for review.



ASSESSMENT AND PLAN:  The patient is a 44-year-old male with past medical history of

diabetes, anemia of chronic disease, and pancreatic cancer with metastatic disease

to liver secondary to a neuroendocrine tumor, presenting with increased right upper

quadrant abdominal pain/midepigastric pain with imaging and labs concerning for

possible obstruction of the biliary tree. 



Biliary obstruction/jaundice.  The patient is presenting with a history of

metastatic pancreatic cancer with a diagnosis made in August of 2019 with a liver

biopsy at that time showing the presence of a neuroendocrine tumor.  Since then, the

patient has had intermittent episodes of worsening right upper quadrant abdominal

pain and had been placed on octreotide as part of treatment for this neuroendocrine

tumor with improvement in symptoms, but these treatments were stopped in October 2019 for unknown reasons.  Now, the patient is coming in with again increased right

upper quadrant abdominal pain, but this time associated with a significant elevation

in both alkaline phosphatase and total bilirubin in addition to imaging showing his

common bile duct at 1.3 cm concerning for extrinsic compression from the pancreatic

tumor itself.  Attempted ERCP was performed on January 6, 2020, but was ultimately

unsuccessful in its ability to cannulate the common bile duct with no stent placed

at that time (nor was any sphincterotomy made at that time).  At this time, the most

likely reason for his elevated bilirubin would be the presence of the metastatic

disease within the liver as well as extrinsic compression of the common bile duct.

In the post ERCP setting, he did have some downtrending of his LFTs including

alkaline phosphatase and total bilirubin, but within the last 24 hours, this has

since returned to more elevated levels again concerning for continued compression.

At this time, the patient would benefit from repeat ERCP and placement of at least a

plastic stent within the common bile duct to temporize bile flow so that the patient

can be evaluated by the Oncology Service as an outpatient down at [facility] (the patient

has been receiving a bulk of his care so far for this down at [facility]). 



RECOMMENDATIONS:  

1. Would continue to trend the patient's LFTs daily for monitoring of worsening

obstruction. 

2. Would continue to trend his INR daily given the presence of metastatic disease

and monitoring for significant liver dysfunction. 

3. Would attempt to transfer the patient either to [facility] or a tertiary care facility

for 

repeat ERCP with stent placement.

4. Pain control per primary team.

5. We will continue antibiotics as part of prophylaxis for possible infection

secondary to biliary obstruction. 

We will continue to follow.  Please call with any questions.





Job ID:  [id number]
0.25

## 2020-01-09 VITALS — TEMPERATURE: 98.6 F

## 2020-01-09 NOTE — DIS
DATE OF ADMISSION:  01/05/2020



DATE OF DISCHARGE:  01/09/2020



DISCHARGE DISPOSITION:  [facility].  The patient was discharged at 2 a.m. this morning.



INPATIENT CONSULTANT:  Gastroenterology, Dr. Boston.



BRIEF HOSPITAL COURSE:  The patient is a 44-year-old male with pancreatic cancer,

presented to the emergency room on January 5, 2020, with abdominal pain.  He was

found to have biliary obstruction.  He was transferred to this facility for ERCP.

An ERCP was attempted on January 6, 2020, by Dr. Boston.  The ampulla of Vater was

easily identified in the second portion of the duodenum with black bile emanating

from the ampullary orifice.  The cannulation of the common bile duct was

unsuccessful.  He was transferred to [facility] this morning.  His bilirubin yesterday was

5.2 with AST of 85, ALT of 68, alkaline phosphatase of 1744. 



FINAL DIAGNOSES:  

1. Obstructive jaundice.

2. History of pancreatic cancer followed at [facility].

3. Diabetes mellitus type 2.

4. Chronic anemia.

5. Abnormal liver function tests secondary to #1.

6. Hyponatremia.

7. Coagulopathy with hypoalbuminemia due to abnormal liver function.

8. Penicillin allergy.







Job ID:  [id number]